# Patient Record
Sex: FEMALE | Race: BLACK OR AFRICAN AMERICAN | NOT HISPANIC OR LATINO | URBAN - NONMETROPOLITAN AREA
[De-identification: names, ages, dates, MRNs, and addresses within clinical notes are randomized per-mention and may not be internally consistent; named-entity substitution may affect disease eponyms.]

---

## 2018-08-06 ENCOUNTER — IMPORTED ENCOUNTER (OUTPATIENT)
Dept: URBAN - NONMETROPOLITAN AREA CLINIC 1 | Facility: CLINIC | Age: 58
End: 2018-08-06

## 2019-10-22 ENCOUNTER — IMPORTED ENCOUNTER (OUTPATIENT)
Dept: URBAN - NONMETROPOLITAN AREA CLINIC 1 | Facility: CLINIC | Age: 59
End: 2019-10-22

## 2019-10-22 PROBLEM — H02.403: Noted: 2019-10-22

## 2019-10-22 PROBLEM — H52.13: Noted: 2019-10-22

## 2019-10-22 PROBLEM — H52.221: Noted: 2019-10-22

## 2019-10-22 PROCEDURE — 99202 OFFICE O/P NEW SF 15 MIN: CPT

## 2019-10-22 NOTE — PATIENT DISCUSSION
"""Contact Lens"" Ptosis BUL- discussed with patient that her symptoms could possibly due to her contact lens. - discussed possiblity of LASIK OU due to contact lens wear and creating ptosis. - recommend LASIK eval with Dr. Codi Echols"

## 2020-06-03 ENCOUNTER — IMPORTED ENCOUNTER (OUTPATIENT)
Dept: URBAN - NONMETROPOLITAN AREA CLINIC 1 | Facility: CLINIC | Age: 60
End: 2020-06-03

## 2020-06-03 PROCEDURE — 92310 CONTACT LENS FITTING OU: CPT

## 2020-06-03 NOTE — PATIENT DISCUSSION
"""Contact Lens"" Ptosis BUL- discussed with patient that her symptoms could possibly due to her contact lens. - discussed possiblity of LASIK OU due to contact lens wear and creating ptosis. - recommend LASIK eval with Dr. Maria Ines Linares"

## 2021-10-26 ENCOUNTER — IMPORTED ENCOUNTER (OUTPATIENT)
Dept: URBAN - NONMETROPOLITAN AREA CLINIC 1 | Facility: CLINIC | Age: 61
End: 2021-10-26

## 2021-10-26 PROBLEM — H52.223: Noted: 2021-10-26

## 2021-10-26 PROBLEM — H25.13: Noted: 2021-10-26

## 2021-10-26 PROBLEM — H52.13: Noted: 2019-10-22

## 2021-10-26 PROCEDURE — 99214 OFFICE O/P EST MOD 30 MIN: CPT

## 2021-10-26 PROCEDURE — 92015 DETERMINE REFRACTIVE STATE: CPT

## 2021-10-26 PROCEDURE — 92310 CONTACT LENS FITTING OU: CPT

## 2021-10-26 NOTE — PATIENT DISCUSSION
Samy OUDiscussed diagnosis with patient. Reviewed symptoms related to cataract progression. Discussed various treatment options with patient. Recommend cataract evaluation by Dr. Adair Menchaca. Patient elects to schedule. Myopia/Astigmatism OUDiscussed refractive status in detail with patient. New glasses and contact Rx given today (patient broke her glasses and needs to be out of contacts for two weeks prior to cataract evaluation). Discussed proper care replacement and hygiene. Continue to monitor.

## 2022-01-17 ENCOUNTER — IMPORTED ENCOUNTER (OUTPATIENT)
Dept: URBAN - NONMETROPOLITAN AREA CLINIC 1 | Facility: CLINIC | Age: 62
End: 2022-01-17

## 2022-01-17 PROCEDURE — 99214 OFFICE O/P EST MOD 30 MIN: CPT

## 2022-01-17 NOTE — PATIENT DISCUSSION
Cataract(s)-Visually significant cataract OU .-Cataract(s) causing symptomatic impairment of visual function not correctable with a tolerable change in glasses or contact lenses lighting or non-operative means resulting in specific activity limitations and/or participation restrictions including but not limited to reading viewing television driving or meeting vocational or recreational needs. -Expectation is clearer vision and functional improvement in symptoms as well as reduced glare disability after cataract removal.-Order IOLMaster and OPD today. -Recommend Stand/Trad  based on today's OPD testing and lifestyle questionnaire.-All questions were answered regarding surgery including pre and post-op medications appointments activity restrictions and anesthetic usage.-The risks benefits and alternatives and special risk factors for the patient were discussed in detail including but not limited to: bleeding infection retinal detachment vitreous loss problems with the implant and possible need for additional surgery.-Although rare the possibility of complete vision loss was discussed.-The possible need for glasses post-operatively was discussed.-Order medical clearance exam based on history of hypertension & heart disease -Patient elects to proceed with cataract surgery OD . Will schedule at patient's convenience and re-evaluate OS in the future. Pt elects Stand/Trad OU & discussed need for bifocals. Qualifies for [facility] OD/LRI OS discussed lens benefits & advised pt the need still for reading gls but gives best potential for clear uncorrected VA.

## 2022-01-18 PROBLEM — K21.9: Noted: 2022-01-18

## 2022-01-18 PROBLEM — I10: Noted: 2022-01-18

## 2022-01-18 PROBLEM — Z01.818: Noted: 2022-01-18

## 2022-01-18 PROBLEM — I51.9: Noted: 2022-01-18

## 2022-01-18 PROBLEM — Z85.3: Noted: 2022-01-18

## 2022-02-04 ENCOUNTER — SURGERY/PROCEDURE (OUTPATIENT)
Dept: RURAL CLINIC 3 | Facility: CLINIC | Age: 62
End: 2022-02-04

## 2022-02-04 ENCOUNTER — POST-OP (OUTPATIENT)
Dept: RURAL CLINIC 3 | Facility: CLINIC | Age: 62
End: 2022-02-04

## 2022-02-04 DIAGNOSIS — Z98.41: ICD-10-CM

## 2022-02-04 DIAGNOSIS — H25.11: ICD-10-CM

## 2022-02-04 PROCEDURE — 66984 XCAPSL CTRC RMVL W/O ECP: CPT

## 2022-02-04 PROCEDURE — 99024 POSTOP FOLLOW-UP VISIT: CPT

## 2022-02-04 ASSESSMENT — TONOMETRY
OD_IOP_MMHG: 25
OS_IOP_MMHG: 18

## 2022-02-04 ASSESSMENT — VISUAL ACUITY
OS_SC: 20/30
OD_SC: 20/70-2

## 2022-02-04 NOTE — PATIENT DISCUSSION
Patient doing well and stable. Continue post op drops as directed. Recommend night shield X 7 nights. Continue to monitor. I have reviewed and confirmed nurses' notes...

## 2022-02-07 ENCOUNTER — POST OP/EVAL OF SECOND EYE (OUTPATIENT)
Dept: RURAL CLINIC 3 | Facility: CLINIC | Age: 62
End: 2022-02-07

## 2022-02-07 VITALS
BODY MASS INDEX: 28.88 KG/M2 | HEART RATE: 70 BPM | WEIGHT: 163 LBS | DIASTOLIC BLOOD PRESSURE: 87 MMHG | HEIGHT: 63 IN | SYSTOLIC BLOOD PRESSURE: 156 MMHG

## 2022-02-07 DIAGNOSIS — H25.12: ICD-10-CM

## 2022-02-07 DIAGNOSIS — Z98.41: ICD-10-CM

## 2022-02-07 PROCEDURE — 99024 POSTOP FOLLOW-UP VISIT: CPT

## 2022-02-07 ASSESSMENT — VISUAL ACUITY
OS_BAT: 20/50
OS_AM: 20/20
OD_PH: 20/30-1
OD_SC: 20/40
OS_CC: 20/25

## 2022-02-07 ASSESSMENT — TONOMETRY
OD_IOP_MMHG: 16
OS_IOP_MMHG: 16

## 2022-02-07 NOTE — PATIENT DISCUSSION
Patient doing well and stable. Continue post op drops as directed. Recommend night shield X 7 nights. Continue to monitor.

## 2022-02-07 NOTE — PATIENT DISCUSSION
(Surgical) Visually Significant (secondary to glare), discussed the risks, benefits, alternatives, and limitations of cataract surgery. The patient stated a full understanding and a desire to proceed with the procedure. The patient will need to return for pre-op appointment with cataract measurements and to have any additional questions answered, and start pre-operative eye drops as directed Pt elects to proceed w/ CE OS.

## 2022-02-07 NOTE — PATIENT DISCUSSION
Medical Clearance    -Medical clearance done today.   -No outstanding concerns that would preclude surgery.   -Patient is cleared to proceed with scheduled surgery.

## 2022-02-18 ENCOUNTER — POST-OP (OUTPATIENT)
Dept: RURAL CLINIC 3 | Facility: CLINIC | Age: 62
End: 2022-02-18

## 2022-02-18 ENCOUNTER — SURGERY/PROCEDURE (OUTPATIENT)
Dept: RURAL CLINIC 4 | Facility: CLINIC | Age: 62
End: 2022-02-18

## 2022-02-18 DIAGNOSIS — Z98.41: ICD-10-CM

## 2022-02-18 DIAGNOSIS — Z98.42: ICD-10-CM

## 2022-02-18 DIAGNOSIS — H25.12: ICD-10-CM

## 2022-02-18 PROCEDURE — 99024 POSTOP FOLLOW-UP VISIT: CPT

## 2022-02-18 PROCEDURE — 66984 XCAPSL CTRC RMVL W/O ECP: CPT

## 2022-02-18 ASSESSMENT — TONOMETRY
OD_IOP_MMHG: 16
OS_IOP_MMHG: 22

## 2022-02-18 ASSESSMENT — VISUAL ACUITY
OS_SC: 20/30+3
OD_PH: 20/30
OD_SC: 20/40

## 2022-02-23 ENCOUNTER — POST-OP (OUTPATIENT)
Dept: RURAL CLINIC 3 | Facility: CLINIC | Age: 62
End: 2022-02-23

## 2022-02-23 DIAGNOSIS — Z98.42: ICD-10-CM

## 2022-02-23 DIAGNOSIS — Z98.41: ICD-10-CM

## 2022-02-23 PROCEDURE — 99024 POSTOP FOLLOW-UP VISIT: CPT

## 2022-02-23 ASSESSMENT — VISUAL ACUITY
OD_SC: 20/60
OS_PH: 20/15
OS_SC: 20/40-2
OD_PH: 20/30-2

## 2022-02-23 ASSESSMENT — TONOMETRY
OS_IOP_MMHG: 14
OD_IOP_MMHG: 14

## 2022-03-23 ENCOUNTER — POST-OP (OUTPATIENT)
Dept: RURAL CLINIC 3 | Facility: CLINIC | Age: 62
End: 2022-03-23

## 2022-03-23 DIAGNOSIS — Z96.1: ICD-10-CM

## 2022-03-23 DIAGNOSIS — H52.4: ICD-10-CM

## 2022-03-23 PROCEDURE — 92015 DETERMINE REFRACTIVE STATE: CPT

## 2022-03-23 PROCEDURE — 99024 POSTOP FOLLOW-UP VISIT: CPT

## 2022-03-23 ASSESSMENT — TONOMETRY
OD_IOP_MMHG: 16
OS_IOP_MMHG: 16

## 2022-03-23 ASSESSMENT — VISUAL ACUITY
OD_SC: 20/60-2
OS_SC: 20/20-2
OD_PH: 20/50

## 2022-04-09 ASSESSMENT — VISUAL ACUITY
OS_AM: 20/20
OS_GLARE: 20/50
OS_SC: 20/25
OD_SC: 20/40
OD_GLARE: 20/70
OD_PAM: 20/30
OS_GLARE: 20/50
OS_SC: 20/20-2
OS_AM: 20/20
OD_PAM: 20/30
OS_SC: 20/20-
OD_GLARE: 20/70
OD_SC: 20/40
OD_SC: 20/40

## 2022-04-09 ASSESSMENT — TONOMETRY
OD_IOP_MMHG: 17
OD_IOP_MMHG: 10
OS_IOP_MMHG: 16
OS_IOP_MMHG: 10

## 2022-06-28 ENCOUNTER — FOLLOW UP (OUTPATIENT)
Dept: RURAL CLINIC 3 | Facility: CLINIC | Age: 62
End: 2022-06-28

## 2022-06-28 DIAGNOSIS — Z96.1: ICD-10-CM

## 2022-06-28 PROCEDURE — 99213 OFFICE O/P EST LOW 20 MIN: CPT

## 2022-06-28 ASSESSMENT — TONOMETRY
OS_IOP_MMHG: 13
OD_IOP_MMHG: 13

## 2022-06-28 ASSESSMENT — VISUAL ACUITY
OS_SC: 20/20
OD_PH: 20/40
OD_SC: 20/50-2

## 2023-07-05 ENCOUNTER — FOLLOW UP (OUTPATIENT)
Dept: RURAL CLINIC 3 | Facility: CLINIC | Age: 63
End: 2023-07-05

## 2023-07-05 DIAGNOSIS — H43.813: ICD-10-CM

## 2023-07-05 DIAGNOSIS — H52.4: ICD-10-CM

## 2023-07-05 DIAGNOSIS — H10.423: ICD-10-CM

## 2023-07-05 PROCEDURE — 99214 OFFICE O/P EST MOD 30 MIN: CPT

## 2023-07-05 PROCEDURE — 92015 DETERMINE REFRACTIVE STATE: CPT

## 2023-07-05 PROCEDURE — 92250 FUNDUS PHOTOGRAPHY W/I&R: CPT

## 2023-07-05 ASSESSMENT — VISUAL ACUITY
OD_SC: 20/50
OD_PH: 20/30-2
OS_SC: 20/20-1

## 2023-07-05 ASSESSMENT — TONOMETRY
OD_IOP_MMHG: 11
OS_IOP_MMHG: 11